# Patient Record
Sex: FEMALE
[De-identification: names, ages, dates, MRNs, and addresses within clinical notes are randomized per-mention and may not be internally consistent; named-entity substitution may affect disease eponyms.]

---

## 2018-12-18 ENCOUNTER — TRANSCRIPTION ENCOUNTER (OUTPATIENT)
Age: 53
End: 2018-12-18

## 2019-07-09 ENCOUNTER — TRANSCRIPTION ENCOUNTER (OUTPATIENT)
Age: 54
End: 2019-07-09

## 2020-06-17 ENCOUNTER — TRANSCRIPTION ENCOUNTER (OUTPATIENT)
Age: 55
End: 2020-06-17

## 2020-07-17 ENCOUNTER — TRANSCRIPTION ENCOUNTER (OUTPATIENT)
Age: 55
End: 2020-07-17

## 2020-09-15 ENCOUNTER — TRANSCRIPTION ENCOUNTER (OUTPATIENT)
Age: 55
End: 2020-09-15

## 2021-08-14 ENCOUNTER — TRANSCRIPTION ENCOUNTER (OUTPATIENT)
Age: 56
End: 2021-08-14

## 2021-12-23 ENCOUNTER — TRANSCRIPTION ENCOUNTER (OUTPATIENT)
Age: 56
End: 2021-12-23

## 2022-01-13 ENCOUNTER — TRANSCRIPTION ENCOUNTER (OUTPATIENT)
Age: 57
End: 2022-01-13

## 2022-11-25 ENCOUNTER — NON-APPOINTMENT (OUTPATIENT)
Age: 57
End: 2022-11-25

## 2023-06-02 ENCOUNTER — NON-APPOINTMENT (OUTPATIENT)
Age: 58
End: 2023-06-02

## 2023-06-08 PROBLEM — Z00.00 ENCOUNTER FOR PREVENTIVE HEALTH EXAMINATION: Status: ACTIVE | Noted: 2023-06-08

## 2023-06-27 ENCOUNTER — APPOINTMENT (OUTPATIENT)
Dept: ORTHOPEDIC SURGERY | Facility: CLINIC | Age: 58
End: 2023-06-27
Payer: MEDICAID

## 2023-06-27 DIAGNOSIS — M25.511 PAIN IN RIGHT SHOULDER: ICD-10-CM

## 2023-06-27 PROCEDURE — 99204 OFFICE O/P NEW MOD 45 MIN: CPT

## 2023-06-27 NOTE — DISCUSSION/SUMMARY
[All Questions Answered] : Patient (and family) had all questions answered to an agreeable level of satisfaction [Interested in Proceeding] : Patient (and family) expressed understanding and interest in proceeding with the plan as outlined [de-identified] : Patient has a benign-appearing lesion in the

## 2023-06-27 NOTE — PHYSICAL EXAM
[General Appearance - Well-Appearing] : Well appearing [General Appearance - Well Nourished] : well nourished [Oriented To Time, Place, And Person] : Oriented to person, place, and time [Impaired Insight] : Insight and judgment were intact [Affect] : The affect was normal. [Mood] : the mood was normal [Sclera] : the sclera and conjunctiva were normal [Neck Cervical Mass (___cm)] : no neck mass was observed [Heart Rate And Rhythm] : heart rate was normal and rhythm regular [] : No respiratory distress [Abdomen Soft] : Soft [Normal Station and Gait] : gait and station were normal [Normal] : Palpable radial pulse, warm and pink with brisk capillary refill. [Full UE ROM unless otherwise noted:] : Full range of motion unless otherwise noted. [FreeTextEntry1] : On exam patient stands in good balance.  She has an abrasion over the front of her right knee but is healing and has good range of motion.  Right shoulder has pain after 120 degrees but passively can get up to 160 degrees as well as having good rotation.  She also has some mild pain with rotator cuff testing.  She has mild pain over the lateral side of the elbow and radial head but is able to move with good flexion extension supination and pronation.  She has no skin lesions.  She has no lymphadenopathy.  She has no tenderness of the humeral head.  She is neurovascular intact. [Swelling] : no swelling [Erythema] : no erythema [Skin Changes - Describe changes:] : No skin changes noted [Tenderness] : no tenderness

## 2023-06-27 NOTE — DATA REVIEWED
[Imaging Present] : Present [de-identified] : X-rays reviewed from Kady 3, 2020 through the right proximal humerus show a mineralized possibly sclerotic versus calcified lesion in the proximal humeral head.  Is approximately 1-1/2 cm at most.  There is no destruction or lucencies around it to suggest any degeneration.

## 2023-06-27 NOTE — HISTORY OF PRESENT ILLNESS
[Improving] : improving [___ wks] : [unfilled] week(s) ago [4] : currently ~his/her~ pain is 4 out of 10 [FreeTextEntry1] : Is a 74-year-old female who fell after tripping on a step on June 2, 2023 she went for an x-ray the next day and was found to have a lesion in the proximal humerus.  She fell on her right side and had elbow and shoulder pain as well as some knee abrasion and pain.  Everything has been getting better but she still has some pain.\par \par Patient also has a history of possible lupus versus rheumatoid arthritis.  Patient was sent to me because of the lesion found in the proximal humerus.  She is not in no other treatment to date.  The pain is improving.

## 2023-06-27 NOTE — REVIEW OF SYSTEMS
[Feeling Tired] : feeling tired [Arthralgias] : arthralgias [Joint Pain] : joint pain [Joint Stiffness] : joint stiffness [Anxiety] : anxiety [Depression] : depression [Sleep Disturbances] : sleep disturbances

## 2023-06-30 ENCOUNTER — TRANSCRIPTION ENCOUNTER (OUTPATIENT)
Age: 58
End: 2023-06-30

## 2023-10-13 ENCOUNTER — APPOINTMENT (OUTPATIENT)
Dept: ORTHOPEDIC SURGERY | Facility: CLINIC | Age: 58
End: 2023-10-13

## 2024-03-07 PROBLEM — M89.9 BONE LESION: Status: ACTIVE | Noted: 2023-06-27

## 2024-03-08 ENCOUNTER — APPOINTMENT (OUTPATIENT)
Dept: ORTHOPEDIC SURGERY | Facility: CLINIC | Age: 59
End: 2024-03-08
Payer: MEDICAID

## 2024-03-08 VITALS
SYSTOLIC BLOOD PRESSURE: 140 MMHG | BODY MASS INDEX: 34.23 KG/M2 | OXYGEN SATURATION: 98 % | WEIGHT: 186 LBS | HEIGHT: 62 IN | DIASTOLIC BLOOD PRESSURE: 63 MMHG | HEART RATE: 50 BPM

## 2024-03-08 DIAGNOSIS — M89.9 DISORDER OF BONE, UNSPECIFIED: ICD-10-CM

## 2024-03-08 PROCEDURE — 99213 OFFICE O/P EST LOW 20 MIN: CPT

## 2024-03-08 PROCEDURE — 73030 X-RAY EXAM OF SHOULDER: CPT | Mod: RT

## 2024-03-08 NOTE — DATA REVIEWED
[Imaging Present] : Present [de-identified] : X-rays today 2 views of the right shoulder show the same lesion in the proximal humerus.  This is central and posterior in the proximal humerus and sclerotic.  This is consistent with possible cartilage rest versus other benign incidental finding.  There is no change in the past 9 months.

## 2024-03-08 NOTE — DISCUSSION/SUMMARY
[All Questions Answered] : Patient (and family) had all questions answered to an agreeable level of satisfaction [de-identified] : As the patient did not have shoulder pain prior to the fall I think it is likely related to her tendinopathy.  She does have difficulty with elevation.  My recommendation at this point is for her to continue watching this and follow-up with x-ray in 1 year.  With regard to her pain I recommended sports medicine orthopedist for evaluation of rotator cuff tendinopathy.  Follow-up for routine surveillance of bone lesion.  If imaging or pathology/biopsy was ordered, the patient was told to make an appointment to review findings right after all imaging is completed.  We discussed risks, benefits and alternatives. Rationale of care was reviewed and all questions were answered. Patient (and family) had all questions answered to her degree of the level of satisfaction. Patient (and family) expressed understanding and interest in proceeding with the plan as outlined.     This note was done with a voice recognition transcription software and any typos are related to this rather than medical error. Surgical risks reviewed. Patient (and family) had all questions answered to an agreeable level of satisfaction. Patient (and family) expressed understanding and interest in proceeding with the plan as outlined.    [Interested in Proceeding] : Patient (and family) expressed understanding and interest in proceeding with the plan as outlined

## 2024-03-08 NOTE — HISTORY OF PRESENT ILLNESS
[FreeTextEntry1] : Since last have seen the patient her shoulder pain is about the same.  It has not gotten worse.  She does feel some clicking and cracking in the area.  She has not seen anyone regarding for any further evaluation or treatment of the shoulder pain.  She is still able to do her regular activities. [Stable] : stable [2] : currently ~his/her~ pain is 2 out of 10

## 2024-03-08 NOTE — PHYSICAL EXAM
[FreeTextEntry1] : On exam patient stands in good balance.  She has an abrasion over the front of her right knee but is healing and has good range of motion.  Right shoulder has pain after 120 degrees but passively can get up to 160 degrees as well as having good rotation.  She also has some mild pain with rotator cuff testing.  She has mild pain over the lateral side of the elbow and radial head but is able to move with good flexion extension supination and pronation.  She has no skin lesions.  She has no lymphadenopathy.  She has no tenderness of the humeral head.  She is neurovascular intact. [General Appearance - Well-Appearing] : Well appearing [General Appearance - Well Nourished] : well nourished [Affect] : The affect was normal. [Impaired Insight] : Insight and judgment were intact [Oriented To Time, Place, And Person] : Oriented to person, place, and time [Mood] : the mood was normal [Sclera] : the sclera and conjunctiva were normal [Neck Cervical Mass (___cm)] : no neck mass was observed [Heart Rate And Rhythm] : heart rate was normal and rhythm regular [Normal Station and Gait] : gait and station were normal [] : No respiratory distress [Abdomen Soft] : Soft [Swelling] : no swelling [Skin Changes - Describe changes:] : No skin changes noted [Erythema] : no erythema [Normal] : No palpable lymph nodes in the supraclavicular, posterior auricular, or axillary node beds. [Tenderness] : no tenderness [Full UE ROM unless otherwise noted:] : Full range of motion unless otherwise noted.

## 2024-03-08 NOTE — REVIEW OF SYSTEMS
[Feeling Tired] : feeling tired [Arthralgias] : arthralgias [Joint Stiffness] : joint stiffness [Joint Pain] : joint pain [Anxiety] : anxiety [Depression] : depression [Sleep Disturbances] : sleep disturbances

## 2025-02-21 ENCOUNTER — APPOINTMENT (OUTPATIENT)
Dept: ORTHOPEDIC SURGERY | Facility: CLINIC | Age: 60
End: 2025-02-21

## 2025-02-21 DIAGNOSIS — M89.9 DISORDER OF BONE, UNSPECIFIED: ICD-10-CM
